# Patient Record
Sex: MALE | NOT HISPANIC OR LATINO | Employment: UNEMPLOYED | URBAN - METROPOLITAN AREA
[De-identification: names, ages, dates, MRNs, and addresses within clinical notes are randomized per-mention and may not be internally consistent; named-entity substitution may affect disease eponyms.]

---

## 2018-08-14 ENCOUNTER — OFFICE VISIT (OUTPATIENT)
Dept: MEDICAL GROUP | Facility: MEDICAL CENTER | Age: 33
End: 2018-08-14
Payer: COMMERCIAL

## 2018-08-14 VITALS
OXYGEN SATURATION: 98 % | HEIGHT: 70 IN | WEIGHT: 245.37 LBS | HEART RATE: 71 BPM | BODY MASS INDEX: 35.13 KG/M2 | DIASTOLIC BLOOD PRESSURE: 70 MMHG | SYSTOLIC BLOOD PRESSURE: 110 MMHG | TEMPERATURE: 97.5 F

## 2018-08-14 DIAGNOSIS — E66.9 OBESITY (BMI 35.0-39.9 WITHOUT COMORBIDITY): ICD-10-CM

## 2018-08-14 DIAGNOSIS — Z30.09 VASECTOMY EVALUATION: ICD-10-CM

## 2018-08-14 DIAGNOSIS — Z00.00 ANNUAL PHYSICAL EXAM: ICD-10-CM

## 2018-08-14 PROCEDURE — 99385 PREV VISIT NEW AGE 18-39: CPT | Performed by: PHYSICIAN ASSISTANT

## 2018-08-14 ASSESSMENT — PATIENT HEALTH QUESTIONNAIRE - PHQ9: CLINICAL INTERPRETATION OF PHQ2 SCORE: 0

## 2018-08-14 NOTE — ASSESSMENT & PLAN NOTE
This is a 33-year-old male who recently moved from [place] to [place]. He is  with a 2-year-old boy. No chronic medical conditions. No significant family history of any medical complaints. Takes no medication. 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 Wife moved here as a nurse for a behavioral [employer].

## 2018-08-14 NOTE — PROGRESS NOTES
"Subjective:   Amanda Cavanaugh II is a 33 y.o. male here today for annual physical.    Annual physical exam  This is a 33-year-old male who recently moved from [place] to [place]. He is  with a 2-year-old boy. No chronic medical conditions. No significant family history of any medical complaints. Takes no medication. 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 Wife moved here as a nurse for a behavioral [employer].       Current medicines (including changes today)  No current outpatient prescriptions on file.     No current facility-administered medications for this visit.      He  has no past medical history on file.    Social History and Family History were reviewed and updated.    ROS   No chest pain, no shortness of breath, no abdominal pain and all other systems were reviewed and are negative.       Objective:     Blood pressure 110/70, pulse 71, temperature 36.4 °C (97.5 °F), height 1.778 m (5' 10\"), weight 111.3 kg (245 lb 6 oz), SpO2 98 %. Body mass index is 35.21 kg/m².   Physical Exam:  Constitutional: Alert, no distress.  Skin: Warm, dry, good turgor, no rashes in visible areas.  Eye: Equal, round and reactive, conjunctiva clear, lids normal.  ENMT: Lips without lesions, good dentition, oropharynx clear.  Neck: Trachea midline, no masses.   Lymph: No cervical or supraclavicular lymphadenopathy  Respiratory: Unlabored respiratory effort, lungs clear to auscultation, no wheezes, no ronchi.  Cardiovascular: Normal S1, S2, no murmur, no edema.  Abdomen: Soft, non-tender, no masses.  Psych: Alert and oriented x3, normal affect and mood.        Assessment and Plan:   The following treatment plan was discussed    1. Annual physical exam  Ordered labs fasting. He will be contacted with results.  - LIPID PROFILE; Future  - COMP METABOLIC PANEL; Future  - HEMOGLOBIN A1C; Future    2. Obesity (BMI 35.0-39.9 without comorbidity)  Advise exercise routinely. Eat healthier. We'll monitor.  - Patient identified as " having weight management issue.  Appropriate orders and counseling given.    3. Vasectomy evaluation  Referred to urology for evaluation.  - REFERRAL TO UROLOGY      Followup: Return in about 1 year (around 8/14/2019), or if symptoms worsen or fail to improve.    Please note that this dictation was created using voice recognition software. I have made every reasonable attempt to correct obvious errors, but I expect that there are errors of grammar and possibly content that I did not discover before finalizing the note.